# Patient Record
Sex: FEMALE | Race: BLACK OR AFRICAN AMERICAN
[De-identification: names, ages, dates, MRNs, and addresses within clinical notes are randomized per-mention and may not be internally consistent; named-entity substitution may affect disease eponyms.]

---

## 2017-02-01 ENCOUNTER — RESULT REVIEW (OUTPATIENT)
Age: 52
End: 2017-02-01

## 2018-08-31 ENCOUNTER — INPATIENT (INPATIENT)
Facility: HOSPITAL | Age: 53
LOS: 0 days | DRG: 55 | End: 2018-08-31
Attending: STUDENT IN AN ORGANIZED HEALTH CARE EDUCATION/TRAINING PROGRAM | Admitting: STUDENT IN AN ORGANIZED HEALTH CARE EDUCATION/TRAINING PROGRAM
Payer: COMMERCIAL

## 2018-08-31 VITALS
HEART RATE: 57 BPM | RESPIRATION RATE: 20 BRPM | OXYGEN SATURATION: 100 % | DIASTOLIC BLOOD PRESSURE: 63 MMHG | SYSTOLIC BLOOD PRESSURE: 80 MMHG

## 2018-08-31 DIAGNOSIS — D49.9 NEOPLASM OF UNSPECIFIED BEHAVIOR OF UNSPECIFIED SITE: ICD-10-CM

## 2018-08-31 PROCEDURE — 99285 EMERGENCY DEPT VISIT HI MDM: CPT | Mod: 25

## 2018-08-31 PROCEDURE — 96372 THER/PROPH/DIAG INJ SC/IM: CPT

## 2018-08-31 RX ORDER — HYDROMORPHONE HYDROCHLORIDE 2 MG/ML
1 INJECTION INTRAMUSCULAR; INTRAVENOUS; SUBCUTANEOUS EVERY 4 HOURS
Qty: 0 | Refills: 0 | Status: DISCONTINUED | OUTPATIENT
Start: 2018-08-31 | End: 2018-08-31

## 2018-08-31 RX ORDER — HYDROMORPHONE HYDROCHLORIDE 2 MG/ML
1 INJECTION INTRAMUSCULAR; INTRAVENOUS; SUBCUTANEOUS ONCE
Qty: 0 | Refills: 0 | Status: DISCONTINUED | OUTPATIENT
Start: 2018-08-31 | End: 2018-08-31

## 2018-08-31 RX ADMIN — HYDROMORPHONE HYDROCHLORIDE 1 MILLIGRAM(S): 2 INJECTION INTRAMUSCULAR; INTRAVENOUS; SUBCUTANEOUS at 02:54

## 2018-08-31 NOTE — ED ADULT NURSE NOTE - ED STAT RN HANDOFF DETAILS 2
Report given to DARIANA Michael. Family continues to remain at bedside with patient. Patient resting on stretcher, wrapped in warm blankets. Stretcher locked in low position.

## 2018-08-31 NOTE — ED ADULT NURSE NOTE - OBJECTIVE STATEMENT
pt biba from home cachectic, end stage ca, as per  do least invasive procedures, as per md pt doesn't need to be on monitor or vitals, subcutaneous lock placed for meds as per md,  called, comfort measures in place, pt with family

## 2018-08-31 NOTE — ED PROVIDER NOTE - ATTENDING CONTRIBUTION TO CARE
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  states he did not enroll her in hospice as they have very supportive family network and did not feel they needed the extra support for her care. pt unable to provide any hx.  states pt has been minimally responsive and progressively so over last 2-3 days. pt takes oxycontin q8 at home for pain, however has not been able to take her PO meds over last 2 days due to somnolence. as per , called EMS tonight as pt appeared to be in pain.  at bedside states "I know she is dying" and stresses to EMS and ED staff that she has longstanding DNR/I order w priority for her comfort at this time.     ros: unobtainable due to acuity/ ams    Physical Exam:   constitutional - chronically ill appearing, extremely cachectic w bitemporal severe wasting, pt w agonal respirations on arrival. does withdraw to painful stimuli/ iv start attempts . actively dying on arrival  head - no external evidence of trauma  cvs - rrr, no murmurs, no peripheral edema  resp - breath sounds clear and equal bilat  gi - abdomen soft and nontender, no rigidity, guarding or rebound  msk - withdraws to pain  neuro - does not follow commands, withdraws only to pain.   skin- cool to touch  psych - unable to assess     pt extremely cachectic (estimated 60 lbs) w metastatic ca not amenable to further disease modifying therapy per ;  affirms (w pt's sister/ son at bedside) that pt is DNR/I, wants comfort measures only. we discussed that pt is in her dying process -  affirms that he is aware and agrees w plan for comfort. attempt made to establish iv access though difficulty; opted for subcutaneous iv for administration of pain meds to avoid further needlesticks. though pt appears comfortable, given small dose of dilaudid as pt has not been able to take her chronic long acting oxy in last few days.   pt  in ED as above w family at bedside. RITA Bernstein MD

## 2018-08-31 NOTE — ED ADULT NURSE NOTE - NSIMPLEMENTINTERV_GEN_ALL_ED
Implemented All Universal Safety Interventions:  [place] to call system. Call bell, personal items and telephone within reach. Instruct patient to call for assistance. Room bathroom lighting operational. Non-slip footwear when patient is off stretcher. Physically safe environment: no spills, clutter or unnecessary equipment. Stretcher in lowest position, wheels locked, appropriate side rails in place.

## 2018-08-31 NOTE — ED ADULT NURSE NOTE - ED STAT RN HANDOFF DETAILS
Report received from [place] RN Report received from DARIANA Shook. Pt has Subcutaneous IV placed in left anterior thigh for medication administration by DARIANA Shook as per MD order.

## 2018-08-31 NOTE — ED ADULT NURSE REASSESSMENT NOTE - NS ED NURSE REASSESS COMMENT FT1
Pt lying on stretcher with family at bedside. Patient is clean, with blankets and pillow, comfort measures maintained. Family advised to notify staff if needed.

## 2018-08-31 NOTE — ED ADULT NURSE REASSESSMENT NOTE - NS ED NURSE REASSESS COMMENT FT1
07:35 Day FABIAN reassessed pt and found her not to be breathing and without a pulse.  and other family at bedside. TOD 07:35.  [facility] Oleg Sue and pt was found to not be a donor candidate. [facility] Confirmation Number [id number].  denied the need for an autopsy and refused eye care for eye donation.
